# Patient Record
Sex: FEMALE | Race: BLACK OR AFRICAN AMERICAN | URBAN - METROPOLITAN AREA
[De-identification: names, ages, dates, MRNs, and addresses within clinical notes are randomized per-mention and may not be internally consistent; named-entity substitution may affect disease eponyms.]

---

## 2022-06-02 ENCOUNTER — OFFICE VISIT (OUTPATIENT)
Dept: URBAN - METROPOLITAN AREA CLINIC 26 | Facility: CLINIC | Age: 27
End: 2022-06-02
Payer: COMMERCIAL

## 2022-06-02 DIAGNOSIS — H52.13 MYOPIA, BILATERAL: ICD-10-CM

## 2022-06-02 DIAGNOSIS — H55.01 CONGENITAL NYSTAGMUS: Primary | ICD-10-CM

## 2022-06-02 PROCEDURE — 92004 COMPRE OPH EXAM NEW PT 1/>: CPT | Performed by: OPTOMETRIST

## 2022-06-02 ASSESSMENT — VISUAL ACUITY
OS: 20/70
OD: 20/70

## 2022-06-02 ASSESSMENT — INTRAOCULAR PRESSURE
OS: 15
OD: 17

## 2022-06-02 ASSESSMENT — KERATOMETRY: OS: 47.38

## 2022-06-02 NOTE — IMPRESSION/PLAN
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 Bilateral. Plan: limited view of fundus because of nystagmus. no signs of other ocular pathology. No treatment is required at this time. Will continue to observe condition and or symptoms. Patient instructed to call if condition gets worse.